# Patient Record
Sex: MALE | Race: WHITE
[De-identification: names, ages, dates, MRNs, and addresses within clinical notes are randomized per-mention and may not be internally consistent; named-entity substitution may affect disease eponyms.]

---

## 2019-03-16 ENCOUNTER — HOSPITAL ENCOUNTER (EMERGENCY)
Dept: HOSPITAL 91 - FTE | Age: 29
Discharge: HOME | End: 2019-03-16
Payer: COMMERCIAL

## 2019-03-16 ENCOUNTER — HOSPITAL ENCOUNTER (EMERGENCY)
Dept: HOSPITAL 10 - FTE | Age: 29
Discharge: HOME | End: 2019-03-16
Payer: COMMERCIAL

## 2019-03-16 VITALS
WEIGHT: 128.97 LBS | BODY MASS INDEX: 20.24 KG/M2 | BODY MASS INDEX: 20.24 KG/M2 | HEIGHT: 67 IN | WEIGHT: 128.97 LBS | HEIGHT: 67 IN

## 2019-03-16 VITALS — RESPIRATION RATE: 18 BRPM | HEART RATE: 115 BPM | DIASTOLIC BLOOD PRESSURE: 90 MMHG | SYSTOLIC BLOOD PRESSURE: 149 MMHG

## 2019-03-16 DIAGNOSIS — F45.8: Primary | ICD-10-CM

## 2019-03-16 DIAGNOSIS — Z86.79: ICD-10-CM

## 2019-03-16 PROCEDURE — 93005 ELECTROCARDIOGRAM TRACING: CPT

## 2019-03-16 PROCEDURE — 99283 EMERGENCY DEPT VISIT LOW MDM: CPT

## 2019-03-16 RX ADMIN — LORAZEPAM 1 MG: 1 TABLET ORAL at 14:24

## 2019-03-16 NOTE — ERD
ER Documentation


Chief Complaint


Chief Complaint





epigastric pain x 1 wk, cough/congestion, sob x 3 days, anxiety





HPI


Is a 28-year-old male with a history of SVT, anxiety who presents to the 


emergency room complaining of palpitations.  At triage she noted epigastric pain


but he denies this.  Patient states that he is feeling very anxious.  He is 


having episodes of palpitations that is consistent with his SVT.  The patient 


does describe anxiety, hyperventilation, perioral paresthesia and carpal pedal 


spasms.  This is his main concern today.  He recently followed up with Dr. Ashraf as he has not been taking his diltiazem.  He is here because he is 


having persistent symptoms and based on their conversation he would like to 


initiate his diltiazem.  Patient denies any fevers or chills, no pressure in the


chest no exertional symptoms no pleuritic pain.  No nausea vomiting or diarrhea.





ROS


All systems reviewed and are negative except as per history of present illness.





Medications


Home Meds


Active Scripts


Lorazepam* (Lorazepam*) 1 Mg Tablet, 1 MG PO Q8H PRN for ANXIETY, #10 TAB


   Prov:JAMES ALEGRE MD         3/16/19


Diltiazem Hcl* (Diltiazem XT) 180 Mg Capsule.er, 180 MG PO DAILY, #30 CAP


   Prov:JAMES ALEGRE MD         3/16/19


Reported Medications


Diltiazem Hcl (DILTIAZEM 24HR ER) 360 Mg Cap.er.24h, 360 MG PO DAILY


   11/29/13





Allergies


Allergies:  


Coded Allergies:  


     No Known Allergy (Unverified , 3/14/13)





PMhx/Soc


Medical and Surgical Hx:  pt denies Surgical Hx


History of Surgery:  No


Anesthesia Reaction:  No


Hx Neurological Disorder:  No


Hx Respiratory Disorders:  No


Hx Cardiac Disorders:  Yes (SVT)


Hx Psychiatric Problems:  No


Hx Miscellaneous Medical Probl:  No


Hx Alcohol Use:  No


Hx Substance Use:  No


Hx Tobacco Use:  No


Smoking Status:  Never smoker





FmHx


Family History:  No diabetes





Physical Exam


Vitals





Vital Signs


  Date      Temp  Pulse  Resp  B/P (MAP)   Pulse Ox  O2          O2 Flow    FiO2


Time                                                 Delivery    Rate


   3/16/19  98.6    115    18      149/90        98


     12:50                          (109)





Physical Exam


General: Anxious male, well developed, well nourished, no acute distress


Head: Normocephalic, atraumatic.


Eyes: Pupils equally reactive, EOM intact


ENT: Moist mucous membranes


Neck: Supple, no lymphadenopathy


Respiratory: Lungs clear bilaterally, no distress


Cardiovascular: RRR, no murmurs, rubs, or gallops


Abdominal: Soft, non-tender, non-distended, no peritoneal signs


: Deferred


MSK: No edema, no unilateral swelling, 5/5 strength


Neurologic: Alert and oriented, moving all extremities, normal speech, no focal 


weakness, no cerebellar signs


Skin: No rash


Psych: Normal mood, anxious


Results 24 hrs





Current Medications


 Medications
   Dose
          Sig/Shamar
       Start Time
   Status  Last


 (Trade)       Ordered        Route
 PRN     Stop Time              Admin
Dose


                              Reason                                Admin


 Lorazepam
     1 mg           ONCE  ONCE
    3/16/19       DC           3/16/19


(Ativan)                      PO
            14:30
                       14:24



                                             3/16/19 14:31








Procedures/MDM


EKG, MONITORS, & DIAGNOSTIC IMAGING:


EKG: I reviewed and interpreted a 12-lead EKG. 


Rhythm: Sinus tachycardia


ST Changes: No contiguous ST segment elevations


T waves: No contiguous T wave inversions


Impression: Sinus tachycardia, normal QRS and QTc, no Brugada





MEDICAL DECISION MAKING:


The patient is here because of persistent symptoms of tachycardia with a history


of SVT.  He has current follow-up with a cardiologist and just saw Dr. Ashraf 


this week.  They talked about reinitiating the patient's diltiazem.  Patient is 


also having signs and symptoms consistent with anxiety and hyperventilation 


syndrome which is his main complaint today.  He has a benign exam otherwise.  He


was tachycardic and hypertensive in triage but this is likely secondary to an


xiety.  On my evaluation the patient has a normal sinus rhythm.





The patient has had thorough outpatient workup and I do not believe that further


laboratory testing or diagnostic imaging is necessary in the emergency room 


setting.  He has a normal sinus rhythm currently and is not currently in SVT.





ER COURSE:


* Patient was provided with Ativan for anxiolysis


* I spoke to Dr. Leonard, on-call for Dr. Ahsraf.  We discussed the case and he 


  agrees that reinitiating diltiazem would be reasonable but he would like to 


  started 180 mg.


* A prescription for diltiazem as noted above as well as Ativan was provided to 


  the patient.  Return precautions were discussed and understood.





CONSULTATION:


Cardiology as above





DISPOSITION PLAN:


The patient does not have an identifiable emergent medical condition that 


warrants inpatient hospitalization at this time.  The patient is deemed safe for


discharge with outpatient follow-up.





We discussed follow up with the patient's primary care doctor within 24 to 48 


hours as needed.  We also discussed return to the emergency room for worsening 


symptoms or worsening condition.





Outpatient referral: Dr. Ashraf





Discharge Medications:


Diltiazem extended release 180 mg once daily


Ativan 1 mg a total of 10 tablets





Departure


Diagnosis:  


   Primary Impression:  


   History of paroxysmal supraventricular tachycardia


   Additional Impression:  


   Hyperventilation syndrome


Condition:  Stable


Patient Instructions:  SVT, Hyperventilation Syndrome


Referrals:  


MARY LYNCH








[facility]


YOU HAVE RECEIVED A MEDICAL SCREENING EXAM AND THE RESULTS INDICATE THAT YOU DO 


NOT HAVE A CONDITION THAT REQUIRES URGENT TREATMENT IN THE EMERGENCY DEPARTMENT.





FURTHER EVALUATION AND TREATMENT OF YOUR CONDITION CAN WAIT UNTIL YOU ARE SEEN 


IN YOUR DOCTORS OFFICE WITHIN THE NEXT 1-2 DAYS. IT IS YOUR RESPONSIBILITY TO 


MAKE AN APPOINTMENT FOR FOLOW-UP CARE.





IF YOU HAVE A PRIMARY DOCTOR


--you should call your primary doctor and schedule an appointment





IF YOU DO NOT HAVE A PRIMARY DOCTOR YOU CAN CALL OUR PHYSICIAN REFERRAL HOTLINE 


AT


 [phone] 





IF YOU CAN NOT AFFORD TO SEE A PHYSICIAN YOU CAN CHOSE FROM THE FOLLOWING 


[facility] [phone] [address]. [facility] [phone] [address] [facility]. [facility] [phone] [address] [facility] [facility] [facility] [phone] [address] [facility] [facility] [phone] [address] [facility]. [facility]. [phone] [address] [facility]. [facility]


YOU HAVE RECEIVED A MEDICAL SCREENING EXAM AND THE RESULTS INDICATE THAT YOU DO 


NOT HAVE A CONDITION THAT REQUIRES URGENT TREATMENT IN THE EMERGENCY DEPARTMENT.





FURTHER EVALUATION AND TREATMENT OF YOUR CONDITION CAN WAIT UNTIL YOU ARE SEEN 


IN YOUR DOCTORS OFFICE WITHIN THE NEXT 1-2 DAYS. IT IS YOUR RESPONSIBILITY TO 


MAKE AN APPOINTMENT FOR FOLOW-UP CARE.





IF YOU HAVE A PRIMARY DOCTOR


--you should call your primary doctor and schedule and appointment





IF YOU DO NOT HAVE A PRIMARY DOCTOR YOU CAN CALL OUR PHYSICIAN REFERRAL HOTLINE 


AT [phone].





IF YOU CAN NOT AFFORD TO SEE A PHYSICIAN YOU CAN CHOSE FROM THE FOLLOWING [facility]


INSTITUTIONS:





[facility]


[address] [place] [address]





[facility]


[address] [place] [address]





[facility]


[address] [place] [address]





Additional Instructions:  


Call your primary care doctor TOMORROW for an appointment during the next 1 


WEEK.Tell the  that you were referred from this facility.See the doctor


sooner or return here if your  condition worsens before your appointment time.











JAMES ALERGE MD          Mar 16, 2019 15:27